# Patient Record
Sex: MALE | Race: OTHER | Employment: UNEMPLOYED | URBAN - METROPOLITAN AREA
[De-identification: names, ages, dates, MRNs, and addresses within clinical notes are randomized per-mention and may not be internally consistent; named-entity substitution may affect disease eponyms.]

---

## 2021-01-16 RX ORDER — DEXTROMETHORPHAN/PSEUDOEPHED 2.5-7.5/.8
1.2 DROPS ORAL
Status: CANCELLED | OUTPATIENT
Start: 2021-01-16

## 2021-01-16 RX ORDER — DIPHENHYDRAMINE HYDROCHLORIDE 50 MG/ML
50 INJECTION, SOLUTION INTRAMUSCULAR; INTRAVENOUS ONCE
Status: CANCELLED | OUTPATIENT
Start: 2021-01-16 | End: 2021-01-16

## 2021-01-16 RX ORDER — EPINEPHRINE 0.1 MG/ML
1 INJECTION INTRACARDIAC; INTRAVENOUS
Status: CANCELLED | OUTPATIENT
Start: 2021-01-16 | End: 2021-01-17

## 2021-01-16 RX ORDER — SODIUM CHLORIDE 0.9 % (FLUSH) 0.9 %
5-40 SYRINGE (ML) INJECTION AS NEEDED
Status: CANCELLED | OUTPATIENT
Start: 2021-01-16

## 2021-01-16 RX ORDER — ATROPINE SULFATE 0.1 MG/ML
0.5 INJECTION INTRAVENOUS
Status: CANCELLED | OUTPATIENT
Start: 2021-01-16 | End: 2021-01-17

## 2021-01-16 RX ORDER — SODIUM CHLORIDE 0.9 % (FLUSH) 0.9 %
5-40 SYRINGE (ML) INJECTION EVERY 8 HOURS
Status: CANCELLED | OUTPATIENT
Start: 2021-01-16

## 2021-01-18 ENCOUNTER — HOSPITAL ENCOUNTER (OUTPATIENT)
Dept: PREADMISSION TESTING | Age: 81
Discharge: HOME | End: 2021-01-18
Payer: MEDICARE

## 2021-01-18 PROCEDURE — U0003 INFECTIOUS AGENT DETECTION BY NUCLEIC ACID (DNA OR RNA); SEVERE ACUTE RESPIRATORY SYNDROME CORONAVIRUS 2 (SARS-COV-2) (CORONAVIRUS DISEASE [COVID-19]), AMPLIFIED PROBE TECHNIQUE, MAKING USE OF HIGH THROUGHPUT TECHNOLOGIES AS DESCRIBED BY CMS-2020-01-R: HCPCS

## 2021-01-19 LAB — SARS-COV-2, COV2NT: NOT DETECTED

## 2021-01-20 ENCOUNTER — HOSPITAL ENCOUNTER (OUTPATIENT)
Age: 81
Setting detail: OUTPATIENT SURGERY
Discharge: HOME | End: 2021-01-20
Attending: INTERNAL MEDICINE | Admitting: INTERNAL MEDICINE
Payer: MEDICARE

## 2021-01-20 VITALS
TEMPERATURE: 97.2 F | RESPIRATION RATE: 16 BRPM | DIASTOLIC BLOOD PRESSURE: 49 MMHG | OXYGEN SATURATION: 100 % | BODY MASS INDEX: 22.48 KG/M2 | HEIGHT: 66 IN | WEIGHT: 139.9 LBS | SYSTOLIC BLOOD PRESSURE: 115 MMHG | HEART RATE: 58 BPM

## 2021-01-20 LAB — GLUCOSE BLD STRIP.AUTO-MCNC: 120 MG/DL (ref 70–110)

## 2021-01-20 PROCEDURE — 77030013991 HC SNR POLYP ENDOSC BSC -A: Performed by: INTERNAL MEDICINE

## 2021-01-20 PROCEDURE — 77030039961 HC KT CUST COLON BSC -D: Performed by: INTERNAL MEDICINE

## 2021-01-20 PROCEDURE — 99152 MOD SED SAME PHYS/QHP 5/>YRS: CPT | Performed by: INTERNAL MEDICINE

## 2021-01-20 PROCEDURE — 76040000007: Performed by: INTERNAL MEDICINE

## 2021-01-20 PROCEDURE — 2709999900 HC NON-CHARGEABLE SUPPLY: Performed by: INTERNAL MEDICINE

## 2021-01-20 PROCEDURE — 99153 MOD SED SAME PHYS/QHP EA: CPT | Performed by: INTERNAL MEDICINE

## 2021-01-20 PROCEDURE — 74011250636 HC RX REV CODE- 250/636: Performed by: INTERNAL MEDICINE

## 2021-01-20 PROCEDURE — 82962 GLUCOSE BLOOD TEST: CPT

## 2021-01-20 PROCEDURE — 77030040361 HC SLV COMPR DVT MDII -B: Performed by: INTERNAL MEDICINE

## 2021-01-20 PROCEDURE — 88305 TISSUE EXAM BY PATHOLOGIST: CPT

## 2021-01-20 RX ORDER — METOPROLOL SUCCINATE 25 MG/1
TABLET, EXTENDED RELEASE ORAL DAILY
COMMUNITY

## 2021-01-20 RX ORDER — FLUMAZENIL 0.1 MG/ML
0.2 INJECTION INTRAVENOUS
Status: DISCONTINUED | OUTPATIENT
Start: 2021-01-20 | End: 2021-01-20 | Stop reason: HOSPADM

## 2021-01-20 RX ORDER — FUROSEMIDE 20 MG/1
TABLET ORAL DAILY
COMMUNITY

## 2021-01-20 RX ORDER — SODIUM CHLORIDE 9 MG/ML
1000 INJECTION, SOLUTION INTRAVENOUS CONTINUOUS
Status: DISCONTINUED | OUTPATIENT
Start: 2021-01-20 | End: 2021-01-20 | Stop reason: HOSPADM

## 2021-01-20 RX ORDER — GUAIFENESIN 100 MG/5ML
81 LIQUID (ML) ORAL DAILY
COMMUNITY

## 2021-01-20 RX ORDER — METFORMIN HYDROCHLORIDE 500 MG/1
TABLET ORAL 2 TIMES DAILY WITH MEALS
COMMUNITY

## 2021-01-20 RX ORDER — ATORVASTATIN CALCIUM 20 MG/1
TABLET, FILM COATED ORAL DAILY
COMMUNITY

## 2021-01-20 RX ORDER — GLIPIZIDE 5 MG/1
5 TABLET ORAL 2 TIMES DAILY
COMMUNITY

## 2021-01-20 RX ORDER — LISINOPRIL 20 MG/1
TABLET ORAL DAILY
COMMUNITY

## 2021-01-20 RX ORDER — FENTANYL CITRATE 50 UG/ML
100 INJECTION, SOLUTION INTRAMUSCULAR; INTRAVENOUS
Status: DISCONTINUED | OUTPATIENT
Start: 2021-01-20 | End: 2021-01-20 | Stop reason: HOSPADM

## 2021-01-20 RX ORDER — NALOXONE HYDROCHLORIDE 0.4 MG/ML
0.4 INJECTION, SOLUTION INTRAMUSCULAR; INTRAVENOUS; SUBCUTANEOUS
Status: DISCONTINUED | OUTPATIENT
Start: 2021-01-20 | End: 2021-01-20 | Stop reason: HOSPADM

## 2021-01-20 RX ORDER — MIDAZOLAM HYDROCHLORIDE 1 MG/ML
.25-5 INJECTION, SOLUTION INTRAMUSCULAR; INTRAVENOUS
Status: DISCONTINUED | OUTPATIENT
Start: 2021-01-20 | End: 2021-01-20 | Stop reason: HOSPADM

## 2021-01-20 RX ADMIN — SODIUM CHLORIDE 1000 ML: 900 INJECTION, SOLUTION INTRAVENOUS at 11:21

## 2021-01-20 NOTE — H&P
Assessment/Plan  # Detail Type Description    1. Assessment Anemia (D64.9). Patient Plan 78 yr old male patient of Dr. Dixon Feldman seen for anemia. Hgb 11.3 on Dec 24, 2019 with normal MCV and MCH. I have no other values or other tests. need to investigate         2. Assessment Constipation (K59.00). Patient Plan 78 yr old male patient of Dr. Dixon Feldman seen for constipation. daughter is translating for him as he doesn't speak any english. He has been constipated x 6 months now he has one bm every 2 to 1 day. He has to take Milk of Magnesia every 2 days to help with his bowels. he is living recently with his daughter. no rectal bleeding or melena. Rarely he has mild rare vague abdominal pain? when constipated. he never had a colonoscopy. He denied having dyspepsia, heartburns, N/vx, dysphagia or weight loss. No smoking, drinking or taking NSAID's  We need to do a colonoscopy and investigate the anemia. R/o hypothyroid  no family history of colon neoplasm. I explained to the patient the procedure of colonoscopy and the risks involved including but not limited to bleeding, perforation, infection or missing a lesion if the bowels are not well clean or are unusually tortuous and difficult. I gave him the  Suprep. He was agreeable to this and answered his questions. don't take medication the morning of the procedure but bring all his medications with him    Plan Orders CBC, Platelet; No Differential to be performed. , Comp. Metabolic Panel (14) to be performed., Iron and TIBC to be performed. and TSH to be performed. This 78year old male presents for Anemia and Constipation. History of Present Illness:  1. Anemia   Relevant medical history includes diabetes. Associated symptoms include constipation.  Pertinent negatives include abdominal pain, amenorrhea, anorexia, black tarry stools, bleeding gums, bone pain, brittle nails, chest pain, chills, cold intolerance, dark urine, decreased libido, depression, diarrhea, dizziness, dyspnea, fatigue, gait disturbance, headache, hypotension, impaired sense of smell, impotence, irritability, jaundice, joint pain, nausea, numbness, pagophagia, pallor, pica, pigment change, sore mouth, sore tongue, syncope, tachycardia, tingling, vomiting, weakness and weight loss. Additional information: Patient was referred for anemia from primary care physician. 2.  Constipation   Severity level 5. The patient describes it as difficulty passing, hard and no mucus. It occurs randomly. The problem is with no change. Denies aggravating symptoms. Relieving factors include laxatives. Pertinent negatives include abdominal pain, anorexia, back pain, black tarry stools, bleeding with bowel movement, bloating, change in appetite, change in stool caliber, change in stool pattern, flatulence, nausea, pain with passing stool, sedentary activity, vomiting, weight gain and weight loss. Additional information: No family history of Colon Cancer, No family history of Crohn's/Colitis, No recent travel out of the country and Patient has to take milk magnesium to use the bathroom. PROBLEM LIST:   Problem List reviewed. PAST MEDICAL/SURGICAL HISTORY   (Detailed)    Disease/disorder Onset Date Management Date Comments     Heart Surgery     Diabetes       Hypertension             Family History  (Detailed)  Relationship Family Member Name  Age at Death Condition Onset Age Cause of Death       Family history of Diabetes mellitus  N         Social History:  (Detailed)  Tobacco use reviewed. Preferred language is Niuean. MARITAL STATUS/FAMILY/SOCIAL SUPPORT  Marital status: Single   Tobacco use status: Current non-smoker. Smoking status: Never smoker. TOBACCO SCREENING:  Patient has never used tobacco. Patient has not used tobacco in the last 30 days. Patient has not used smokeless tobacco in the last 30 days.     SMOKING STATUS  Type Smoking Status Usage Per Day Years Used Pack Years Total Pack Years    Never smoker         ALCOHOL  There is no history of alcohol use. CAFFEINE  The patient uses caffeine: coffee. LIFESTYLE  PETS   cats. Medications (active prior to today)  Medication Name Sig Description Start Date Stop Date Refilled Rx Elsewhere   glipizide 5 mg tablet take 1 tablet by oral route 2 times every day before meals //   Y   aspirin 81 mg chewable tablet chew 1 tablet by oral route  every day //   Y   atorvastatin 40 mg tablet take 1 tablet by oral route  every day //   Y   furosemide 20 mg tablet take 1 tablet by oral route  every day //   Y   lisinopril 20 mg tablet take 1 tablet by oral route  every day //   Y   metformin 500 mg tablet take 1 tablet by oral route 2 times every day with morning and evening meals //   Y   metoprolol succinate ER 50 mg tablet,extended release 24 hr take 0.5 tablet by ORAL route  every day //   Y     Patient Status   Completed with information received for patient in a summary of care record. Medication Reconciliation  Medications reconciled today.   Medication Reviewed  Adherence Medication Name Sig Desc Elsewhere Status   taking as directed glipizide 5 mg tablet take 1 tablet by oral route 2 times every day before meals Y Verified   taking as directed aspirin 81 mg chewable tablet chew 1 tablet by oral route  every day Y Verified   taking as directed atorvastatin 40 mg tablet take 1 tablet by oral route  every day Y Verified   taking as directed furosemide 20 mg tablet take 1 tablet by oral route  every day Y Verified   taking as directed lisinopril 20 mg tablet take 1 tablet by oral route  every day Y Verified   taking as directed metformin 500 mg tablet take 1 tablet by oral route 2 times every day with morning and evening meals Y Verified   taking as directed metoprolol succinate ER 50 mg tablet,extended release 24 hr take 0.5 tablet by ORAL route  every day Y Verified     Medications (Added, Continued or Stopped today)  Start Date Medication Directions PRN Status PRN Reason Instruction Stop Date    aspirin 81 mg chewable tablet chew 1 tablet by oral route  every day N       atorvastatin 40 mg tablet take 1 tablet by oral route  every day N       furosemide 20 mg tablet take 1 tablet by oral route  every day N      04/09/2020 GaviLyte-N 420 gram oral solution take as prescribed by physician N       glipizide 5 mg tablet take 1 tablet by oral route 2 times every day before meals N       lisinopril 20 mg tablet take 1 tablet by oral route  every day N       metformin 500 mg tablet take 1 tablet by oral route 2 times every day with morning and evening meals N       metoprolol succinate ER 50 mg tablet,extended release 24 hr take 0.5 tablet by ORAL route  every day N        Allergies:  Ingredient Reaction (Severity) Medication Name Comment   NO KNOWN ALLERGIES          ORDERS:  Status Lab Order Time Frame Comments   ordered CBC, Platelet; No Differential     ordered Comp. Metabolic Panel (14)     ordered Iron and TIBC     ordered TSH       COMMUNICATION:  Call Type Contact Date Details Employee Location After Hours   Other 04/09/2020 10:07 AM  [facility]     Review of System  System Neg/Pos Details   Constitutional Negative Chills, Fatigue, Fever, Irritability, Malaise, Pallor, Sedentary activity, Weight gain and Weight loss. ENMT Negative Bleeding gums, Ear infections, Impaired sense of smell, Nasal congestion, Sinus Infection, Sore mouth, Sore throat and Sore tongue. Eyes Negative Double vision and Eye pain. Respiratory Negative Asthma, Chronic cough, Dyspnea, Pleuritic pain and Wheezing. Cardio Negative Chest pain, Edema, Hypotension, Irregular heartbeat/palpitations and Tachycardia. GI Positive Constipation.    GI Negative Abdominal pain, Anorexia, Black tarry stools, Bleeding with bowel movement, Bloating, Change in appetite, Change in bowel habits, Change in stool caliber, Change in stool pattern, Decreased appetite, Diarrhea, Dysphagia, Flatulence, Heartburn, Hematemesis, Hematochezia, Jaundice, Melena, Nausea, Painful defecation, Reflux and Vomiting.  Negative Back pain, Dark urine, Dysuria, Hematuria, Impotence, Urinary frequency, Urinary incontinence and Urinary retention. Endocrine Negative Cold intolerance, Gynecomastia, Heat intolerance and Increased thirst.   Neuro Negative Dizziness, Gait disturbance, Headache, Numbness, Syncope, Tingling, Tremors and Vertigo. Psych Negative Anxiety, Depression, Increased stress, Pagophagia and Pica. Integumentary Negative Brittle nails, Hives, Pigment change, Pruritus and Rash. MS Negative Back pain, Bone pain, Joint pain, Myalgia and Weakness. Hema/Lymph Negative Easy bleeding, Easy bruising and Lymphadenopathy. Allergic/Immuno Negative Chemicals in work place, Contact allergy, Food allergies, Immunosuppression and Seasonal allergies. Reproductive Negative Amenorrhea, Decreased libido, Penile discharge and Sexual dysfunction. Vital Signs   Height  Time ft in cm Last Measured Height Position   9:08 AM 5.0 5.50 166.37 01/10/2019 Standing     MAP (Calculated) Arterial Line 1 BP (mmHg) BP Patient Position Resp SpO2 O2 Device O2 Flow Rate (L/min) Pre/Post Ductal Weight       01/20/21 1057 97.2 °F (36.2 °C) 64 117/61 80 -- -- 16 100 % Room air -- -- 63.5 kg (139 lb 14.4 oz)       PHYSICAL EXAM:  Exam Findings Details   Constitutional Normal No acute distress. Well Nourished. Well developed.    Eyes Normal General - Right: Normal, Left: Normal. Conjunctiva - Right: Normal, Left: Normal. Sclera - Right: Normal, Left: Normal. Cornea - Right: Normal, Left: Normal. Pupil - Right: Normal, Left: Normal.   Nose/Mouth/Throat Normal Lips/teeth/gums - Normal. Tongue - Normal. Buccal mucosa - Normal. Palate & uvula - Normal.   Neck Exam Normal Inspection - Normal. Palpation - Normal. Thyroid gland - Normal. Cervical lymph nodes - Normal. Respiratory Normal Inspection - Normal. Auscultation - Normal. Percussion - Normal. Cough - Absent. Effort - Normal.   Cardiovascular Normal Heart rate - Regular rate. Heart sounds - Normal S1, Normal S2. Murmurs - None. Extremities - No edema. Abdomen * Inspection - scaphoid abdomen. Abdomen Normal Patient is not obese. Appliance(s) - None. Abdominal muscles - Normal. Auscultation - Normal. Percussion - Normal. Anterior palpation - Normal, No guarding, No rebound. CVA tenderness - None. Umbilicus - Normal. Abdominal reflexes - Normal. No abdominal tenderness. No hepatic enlargement. No spleen enlargement. No hernia. No ascites. No palpable mass. Alvarado's sign - Normal.   Skin Normal Inspection - Normal.   Musculoskeletal Normal Hands - Left: Normal, Right: Normal.   Extremity Normal No cyanosis. No edema. Clubbing - Absent. Neurological Normal Level of consciousness - Normal. Orientation - Normal. Memory - Normal. Motor - Normal. Balance & gait - Normal. Coordination - Normal. Fine motor skills - Normal. DTRs - Normal.   Psychiatric Comments slow 1+   Psychiatric Normal Orientation - Oriented to time, place, person & situation. Not anxious. Appropriate mood and affect. Behavior is appropriate for age. Sufficient language. No memory loss.          No change in H&P

## 2021-01-20 NOTE — DISCHARGE INSTRUCTIONS
Amrita Budjeronimo  [id number]  1940    COLON DISCHARGE INSTRUCTIONS    Discomfort:  Redness at IV site- apply warm compress to area; if redness or soreness persist- contact your physician  There may be a slight amount of blood passed from the rectum  Gaseous discomfort- walking, belching will help relieve any discomfort  You may not operate a vehicle til the next day. You may not engage in an occupation involving machinery or appliances til the next day. You may not drink alcoholic beverages til the next day. DIET:   High fiber diet. ACTIVITY:  You may not  resume your normal daily activities til the next day. it is recommended that you spend the remainder of the day resting -  avoid any strenuous activity. CALL M.D.  IF ANY SIGN OF:   Increasing pain, nausea, vomiting  Abdominal distension (swelling)  New increased bleeding (oral or rectal)  Fever (chills)  Pain in chest area  Bloody discharge from nose or mouth  Shortness of breath    You may  take any Advil, Aspirin, Ibuprofen, Motrin, Aleve, or Goodys for 7 days, ONLY  Tylenol as needed for pain. Can resume the baby ASA tomorrow  Post procedure diagnosis:  SIGMOID DIVERTICULOSIS; POLYP    Follow-up Instructions:   No need for follow up colonoscopy after this one. We will notify you the results of your biopsy by letter within 2 weeks. Ava Wallace MD  January 20, 2021       DISCHARGE SUMMARY from Nurse    PATIENT INSTRUCTIONS:    After general anesthesia or intravenous sedation, for 24 hours or while taking prescription Narcotics:  · Limit your activities  · Do not drive and operate hazardous machinery  · Do not make important personal or business decisions  · Do  not drink alcoholic beverages  · If you have not urinated within 8 hours after discharge, please contact your surgeon on call.     Report the following to your surgeon:  · Excessive pain, swelling, redness or odor of or around the surgical area  · Temperature over 100.5  · Nausea and vomiting lasting longer than 4 hours or if unable to take medications  · Any signs of decreased circulation or nerve impairment to extremity: change in color, persistent  numbness, tingling, coldness or increase pain  · Any questions    What to do at Home:  Recommended activity: Activity as tolerated and no driving for today. *  Please give a list of your current medications to your Primary Care Provider. *  Please update this list whenever your medications are discontinued, doses are      changed, or new medications (including over-the-counter products) are added. *  Please carry medication information at all times in case of emergency situations. These are general instructions for a healthy lifestyle:    No smoking/ No tobacco products/ Avoid exposure to second hand smoke  Surgeon General's Warning:  Quitting smoking now greatly reduces serious risk to your health. Obesity, smoking, and sedentary lifestyle greatly increases your risk for illness    A healthy diet, regular physical exercise & weight monitoring are important for maintaining a healthy lifestyle    You may be retaining fluid if you have a history of heart failure or if you experience any of the following symptoms:  Weight gain of 3 pounds or more overnight or 5 pounds in a week, increased swelling in our hands or feet or shortness of breath while lying flat in bed. Please call your doctor as soon as you notice any of these symptoms; do not wait until your next office visit. The discharge information has been reviewed with the patient and children. The patient and children verbalized understanding. Discharge medications reviewed with the patient and children and appropriate educational materials and side effects teaching were provided.   _________________________________________  Patient armband removed and shredded  ________________________________________  Patient Education        Colonoscopia: Goldy Francisco en el hogar  Colonoscopy: What to Expect at Home  Tristan recuperación  Después de zan tenido camilla colonoscopia, permanecerá en la clínica michelle 1 o 2 horas hasta que el efecto del medicamento haya desaparecido. Joshua Colon a tristan hogar. Simba tendrá que hacer arreglos para que alguien lo lleve. Tristan médico le indicará cuándo podrá comer y hacer ana lilia otras actividades habituales. Tristan médico hablará con usted acerca de cuándo deberá hacerse la siguiente colonoscopia. El médico puede ayudarle a decidir con qué frecuencia necesita que le revisen. [place] dependerá de [employer] de la prueba y de tristan riesgo de cáncer colorrectal.  Después de la prueba, es posible que esté abotagado (inflado) o tenga deisy causados por gases. Es posible que necesite expulsar gases. Si le hicieron camilla biopsia o le extirparon un pólipo, es posible que tenga restos de [place] en las heces michelle algunos días. Problemas billy sangrado rectal intenso podrían no ocurrir hasta varias semanas después de la prueba. [place] no es común. Simba puede suceder después de la extirpación de los pólipos. Esta hoja de [employer] idea general del tiempo que le llevará recuperarse. Sin embargo, cada persona se recupera a un ritmo diferente. Siga los pasos que se mencionan a continuación para recuperarse lo más rápido posible. ¿Cómo puede cuidarse en el hogar? Actividad    · Descanse cuando esté cansado.     · Puede hacer ana lilia actividades habituales cuando usted crea que es adecuado. Alimentación    · Siga las indicaciones de tristan médico para comer.     · Ct abundantes líquidos, a menos que el médico le haya indicado lo contrario. [place] ayuda a reponer los líquidos que perdió michelle la preparación del colon.     · No ct alcohol. Medicamentos    · Tristan médico le dirá si puede volver a segun ana lilia medicamentos y cuándo puede volver a hacerlo.  También le dará indicaciones sobre cualquier medicamento nuevo que deba segun usted.     · Si polly aspirina o cualquier otro medicamento que previene los coágulos de [place], pregúntele a tristan médico si debería volver a tomarlo y en qué momento. Asegúrese de entender exactamente lo que tristan médico quiere que korin.     · Si le extirparon pólipos o le hicieron camilla biopsia michelle la prueba, es posible que tristan médico le diga que no tome aspirina ni otros antiinflamatorios michelle algunos días. Estos incluyen ibuprofeno (Advil, Motrin) y naproxeno (Aleve). Otras instrucciones    · Por tristan seguridad, no debe conducir ni operar maquinaria hasta que hayan desaparecido los efectos del medicamento y pueda pensar con claridad. Tristan médico podría indicarle que no conduzca ni opere maquinaria hasta el día posterior a la prueba.     · No firme documentos legales ni tome decisiones importantes hasta que los efectos del medicamento hayan desaparecido y pueda pensar con claridad. La anestesia puede hacer que le sea difícil comprender plenamente lo que usted está acordando. La atención de seguimiento es camilla parte clave de tristan tratamiento y seguridad. Asegúrese de hacer y acudir a todas las citas, y llame a tristan médico si está teniendo problemas. También es camilla buena idea saber los resultados de los exámenes y mantener camilla lista de los medicamentos que polly. ¿Cuándo debe pedir ayuda? Llame al 911 en cualquier momento que considere que necesita atención de [place]. Por ejemplo, llame si:    · Se desmayó (perdió el conocimiento).     · Evacua heces rojizas o sanguinolentas (con ashleigh).     · Tiene dolor abdominal intenso. Llame a tristan médico ahora mismo o busque atención médica inmediata si:    · Maria Del Rosario heces son negruzcas y parecidas al alquitrán.     · Maria Del Rosario heces tienen rastros de [place], aunque no le hicieron camilla biopsia ni le extirparon pólipos.     · Tiene dolor abdominal, o tristan abdomen está hinchado y giselle.     · Vomita.     · Tiene fiebre.     · Está muy mareado.    Preste especial atención a los cambios en tristan maliha y asegúrese de comunicarse con tristan médico si tiene algún problema. ¿Dónde puede encontrar más información en inglés? Vaya a [email or web address].com/  [place] E[address] en la búsqueda para aprender más acerca de \"Colonoscopia: Qué esperar en el hogar. \"  Revisado: 29 abril, 2020               Versión del contenido: 12.6  © 9138-6477 [employer], [employer]. Las instrucciones de cuidado fueron adaptadas bajo licencia por [employer] (which disclaims liability or warranty for this information). Si usted tiene [place] afección médica o sobre estas instrucciones, siempre pregunte a tristan profesional de maliha.  [employer], [employer] niega toda garantía o responsabilidad por tristan uso de esta información.       __________________________________________________

## 2021-01-20 NOTE — PROCEDURES
[facility]  Colonoscopy Procedure Report  _______________________________________________________  Patient: Beatrice Valdez                                        Attending Physician: Kathi Dsouza MD    Patient ID: [id number]                                    Referring Physician: Gonzalez Hagan MD    Exam Date: 1/20/2021      Introduction: A  78 y.o. male patient, presents for inpatient Colonoscopy    Indications: patient of Dr. Archie Lee seen for constipation. daughter is translating for him as he doesn't speak any english. He has been constipated x 6 months now he has one bm every 2 to 1 day. He has to take Milk of Magnesia every 2 days to help with his bowels. he is living recently with his daughter. no rectal bleeding or melena. Rarely he has mild rare vague abdominal pain? when constipated. he never had a colonoscopy. He denied having dyspepsia, heartburns, N/vx, dysphagia or weight loss. No smoking, drinking or taking NSAID's. Consent: The benefits, risks, and alternatives to the procedure were discussed and informed consent was obtained from the patient. Preparation: EKG, pulse, pulse oximetry and blood pressure were monitored throughout the procedure. ASA Classification: Class II- . The heart is an S1-S2 and regular heart rate and rhythm. Lungs are clear to auscultation and percussion. Abdomen is soft, nondistended, and nontender. Mental Status: awake, alert, and oriented to person, place, and time    Medications:  · Fentanyl 75 mcg IV before procedure. · Versed 3 mg IV throughout the procedure. Rectal Exam: Normal Rectal Exam. No Blood. Prostate not enlarged    Pathology Specimens:  1    Procedure: The colonoscope was passed with ease through the anus under direct visualization and advanced to the cecum. The patient required positioning on the back to aid in the passage of the scope. The scope was withdrawn and the mucosa was carefully examined.  The quality of the preparation was excellent. The views were excellent. The patient's toleration of the procedure was excellent. The exam was done twice to the cecum. Retroflexion is made in the ascending colon. Total time is 21 minutes and withdrawal time is 16 minutes. Findings:    Rectum:   Normal   Sigmoid:   Tortuous sigmoid colon with mild sigmoid diverticulosis. Descending Colon:   Normal   Transverse Colon:   4 mm sessile polyp in the proximal transverse colon, cold snared  Ascending Colon:   One small diverticulum in the proximal ascending colon  Cecum:   Normal   Terminal Ileum:   Not entered      Unplanned Events: There were no unplanned events. Estimated Blood Loss: None  Impressions: Small internal hemorrhoids. Tortuous sigmoid colon with mild sigmoid diverticulosis. 4 mm sessile polyp in the proximal transverse colon, cold snared. One small diverticulum in the proximal ascending colon. Normal Mucosa. No blood or AVM found. Complications: None; patient tolerated the procedure well. Recommendations:  · Discharge home when standard parameters are met. · Resume a high fiber diet. · Resume own medications. Avoid all NSAID's for > 7 days but can resume the  Baby ASA tomorrow  · Repeat Colonoscopy not needed after this.   · Take Miralax and/ or Colace 100 mg on regular basis to treat constipation    Procedure Codes:    · Theresa  [[id number]]    Endoscope Information:  Model Number(s)    [id number]   Assistant: None  Signed By: Devaughn Felipe MD Date: 1/20/2021

## 2022-08-05 ENCOUNTER — APPOINTMENT (OUTPATIENT)
Dept: CT IMAGING | Age: 82
End: 2022-08-05
Attending: EMERGENCY MEDICINE
Payer: MEDICARE

## 2022-08-05 ENCOUNTER — HOSPITAL ENCOUNTER (EMERGENCY)
Age: 82
Discharge: HOME | End: 2022-08-05
Attending: EMERGENCY MEDICINE
Payer: MEDICARE

## 2022-08-05 VITALS
RESPIRATION RATE: 18 BRPM | SYSTOLIC BLOOD PRESSURE: 162 MMHG | HEART RATE: 87 BPM | OXYGEN SATURATION: 98 % | DIASTOLIC BLOOD PRESSURE: 64 MMHG | TEMPERATURE: 98.2 F | BODY MASS INDEX: 22.44 KG/M2 | WEIGHT: 139 LBS

## 2022-08-05 DIAGNOSIS — R52 PAIN OF LEFT SIDE OF BODY: Primary | ICD-10-CM

## 2022-08-05 LAB
ABO + RH BLD: NORMAL
ALBUMIN SERPL-MCNC: 3.8 G/DL (ref 3.4–5)
ALBUMIN/GLOB SERPL: 1.2 {RATIO} (ref 0.8–1.7)
ALP SERPL-CCNC: 101 U/L (ref 45–117)
ALT SERPL-CCNC: 36 U/L (ref 16–61)
ANION GAP SERPL CALC-SCNC: 7 MMOL/L (ref 3–18)
AST SERPL-CCNC: 28 U/L (ref 10–38)
BASOPHILS # BLD: 0 K/UL (ref 0–0.1)
BASOPHILS NFR BLD: 1 % (ref 0–2)
BILIRUB SERPL-MCNC: 0.5 MG/DL (ref 0.2–1)
BLOOD GROUP ANTIBODIES SERPL: NORMAL
BUN SERPL-MCNC: 25 MG/DL (ref 7–18)
BUN/CREAT SERPL: 20 (ref 12–20)
CALCIUM SERPL-MCNC: 9.3 MG/DL (ref 8.5–10.1)
CHLORIDE SERPL-SCNC: 104 MMOL/L (ref 100–111)
CO2 SERPL-SCNC: 26 MMOL/L (ref 21–32)
CREAT SERPL-MCNC: 1.22 MG/DL (ref 0.6–1.3)
DIFFERENTIAL METHOD BLD: ABNORMAL
EOSINOPHIL # BLD: 0.1 K/UL (ref 0–0.4)
EOSINOPHIL NFR BLD: 1 % (ref 0–5)
ERYTHROCYTE [DISTWIDTH] IN BLOOD BY AUTOMATED COUNT: 13.2 % (ref 11.6–14.5)
GLOBULIN SER CALC-MCNC: 3.3 G/DL (ref 2–4)
GLUCOSE SERPL-MCNC: 156 MG/DL (ref 74–99)
HCT VFR BLD AUTO: 32.6 % (ref 36–48)
HGB BLD-MCNC: 10.8 G/DL (ref 13–16)
IMM GRANULOCYTES # BLD AUTO: 0 K/UL (ref 0–0.04)
IMM GRANULOCYTES NFR BLD AUTO: 0 % (ref 0–0.5)
INR PPP: 1 (ref 0.8–1.2)
LACTATE SERPL-SCNC: 1.6 MMOL/L (ref 0.4–2)
LYMPHOCYTES # BLD: 0.8 K/UL (ref 0.9–3.6)
LYMPHOCYTES NFR BLD: 10 % (ref 21–52)
MCH RBC QN AUTO: 31.5 PG (ref 24–34)
MCHC RBC AUTO-ENTMCNC: 33.1 G/DL (ref 31–37)
MCV RBC AUTO: 95 FL (ref 78–100)
MONOCYTES # BLD: 1 K/UL (ref 0.05–1.2)
MONOCYTES NFR BLD: 14 % (ref 3–10)
NEUTS SEG # BLD: 5.7 K/UL (ref 1.8–8)
NEUTS SEG NFR BLD: 74 % (ref 40–73)
NRBC # BLD: 0 K/UL (ref 0–0.01)
NRBC BLD-RTO: 0 PER 100 WBC
PLATELET # BLD AUTO: 158 K/UL (ref 135–420)
PMV BLD AUTO: 10.4 FL (ref 9.2–11.8)
POTASSIUM SERPL-SCNC: 4.4 MMOL/L (ref 3.5–5.5)
PROT SERPL-MCNC: 7.1 G/DL (ref 6.4–8.2)
PROTHROMBIN TIME: 13.1 SEC (ref 11.5–15.2)
RBC # BLD AUTO: 3.43 M/UL (ref 4.35–5.65)
SODIUM SERPL-SCNC: 137 MMOL/L (ref 136–145)
SPECIMEN EXP DATE BLD: NORMAL
WBC # BLD AUTO: 7.7 K/UL (ref 4.6–13.2)

## 2022-08-05 PROCEDURE — 71260 CT THORAX DX C+: CPT

## 2022-08-05 PROCEDURE — 99285 EMERGENCY DEPT VISIT HI MDM: CPT

## 2022-08-05 PROCEDURE — 72125 CT NECK SPINE W/O DYE: CPT

## 2022-08-05 PROCEDURE — 70450 CT HEAD/BRAIN W/O DYE: CPT

## 2022-08-05 PROCEDURE — 80053 COMPREHEN METABOLIC PANEL: CPT

## 2022-08-05 PROCEDURE — 93005 ELECTROCARDIOGRAM TRACING: CPT

## 2022-08-05 PROCEDURE — 85610 PROTHROMBIN TIME: CPT

## 2022-08-05 PROCEDURE — 74011000636 HC RX REV CODE- 636: Performed by: EMERGENCY MEDICINE

## 2022-08-05 PROCEDURE — 86900 BLOOD TYPING SEROLOGIC ABO: CPT

## 2022-08-05 PROCEDURE — 83605 ASSAY OF LACTIC ACID: CPT

## 2022-08-05 PROCEDURE — 85025 COMPLETE CBC W/AUTO DIFF WBC: CPT

## 2022-08-05 RX ORDER — MORPHINE SULFATE 4 MG/ML
4 INJECTION INTRAVENOUS
Status: DISCONTINUED | OUTPATIENT
Start: 2022-08-05 | End: 2022-08-06 | Stop reason: HOSPADM

## 2022-08-05 RX ADMIN — IOPAMIDOL 100 ML: 612 INJECTION, SOLUTION INTRAVENOUS at 21:53

## 2022-08-05 NOTE — ED TRIAGE NOTES
Patient reports left arm pain.  Daughter reports patient had a \" tantrum\" and through himself on the floor Monday

## 2022-08-06 LAB
ATRIAL RATE: 75 BPM
CALCULATED P AXIS, ECG09: 56 DEGREES
CALCULATED R AXIS, ECG10: 23 DEGREES
CALCULATED T AXIS, ECG11: 43 DEGREES
DIAGNOSIS, 93000: NORMAL
P-R INTERVAL, ECG05: 180 MS
Q-T INTERVAL, ECG07: 368 MS
QRS DURATION, ECG06: 84 MS
QTC CALCULATION (BEZET), ECG08: 410 MS
VENTRICULAR RATE, ECG03: 75 BPM

## 2022-08-06 NOTE — ED NOTES
Pt reports he has r sided neck pain. pt's daughter reports the pt had a \"temper tantrum\" threw himself on the floor the other day and she thinks he may have hurt it then, Pt has bruising noted to his R chest from the fall. No open wounds. Pt denies any current pain on arrival states his L side of his neck was hurting him. Daughter reports pt has been dx with \"short term memory loss\" denies any dementia.  Pt alert and oriented on arrival.

## 2022-08-06 NOTE — ED PROVIDER NOTES
64-year-old male past medical history of diabetes, CAD and hypertension presents to the emergency department with a left neck left shoulder and left chest pain. Daughter is in the room she states she is the power of  and the patient has short-term memory loss. He is a poor historian. Daughter said that on Tuesday patient threw himself on the floor. Today he started complaining of pain in various areas. She states he is not someone who complains of pain. Unknown what makes it better or worse. No treatment with medications at home. She did note discoloration to his right chest.  The pain is below the left axilla. Patient has no other complaints. Past Medical History:   Diagnosis Date    CAD (coronary artery disease)     Diabetes ([facility] [address]     Hypertension        Past Surgical History:   Procedure Laterality Date    COLONOSCOPY N/A 1/20/2021    COLONOSCOPY; POLYPECTOMY performed by Vince Reyes MD at [address] [facility]           No family history on file. Social History     Socioeconomic History    Marital status:      Spouse name: Not on file    Number of children: Not on file    Years of education: Not on file    Highest education level: Not on file   Occupational History    Not on file   Tobacco Use    Smoking status: Never    Smokeless tobacco: Never   Vaping Use    Vaping Use: Never used   Substance and Sexual Activity    Alcohol use: Not Currently    Drug use: Not Currently    Sexual activity: Not on file   Other Topics Concern    Not on file   Social History Narrative    Not on file     Social Determinants of Health     Financial Resource Strain: Not on file   Food Insecurity: Not on file   Transportation Needs: Not on file   Physical Activity: Not on file   Stress: Not on file   Social Connections: Not on file   Intimate Partner Violence: Not on file   Housing Stability: Not on file         ALLERGIES: Patient has no known allergies.     Review of Systems   Unable to perform ROS: Dementia     Vitals:    08/05/22 1802 08/05/22 1803   BP:  (!) 162/64   Pulse: 87    Resp: 18    Temp: 98.2 °F (36.8 °C)    SpO2: 98%    Weight: 63 kg (139 lb)             Physical Exam  Vitals and nursing note reviewed. Constitutional:       General: He is not in acute distress. Appearance: Normal appearance. He is normal weight. He is not ill-appearing, toxic-appearing or diaphoretic. HENT:      Head: Normocephalic. Nose: Nose normal.      Mouth/Throat:      Mouth: Mucous membranes are moist.      Pharynx: No oropharyngeal exudate or posterior oropharyngeal erythema. Eyes:      Extraocular Movements: Extraocular movements intact. Pupils: Pupils are equal, round, and reactive to light. Neck:      Vascular: No carotid bruit. Cardiovascular:      Rate and Rhythm: Normal rate and regular rhythm. Pulses: Normal pulses. Pulmonary:      Effort: Pulmonary effort is normal. No respiratory distress. Breath sounds: Normal breath sounds. No stridor. No wheezing, rhonchi or rales. Chest:      Chest wall: No tenderness. Abdominal:      General: There is no distension. Palpations: There is no mass. Tenderness: There is no abdominal tenderness. There is no right CVA tenderness, left CVA tenderness, guarding or rebound. Hernia: No hernia is present. Musculoskeletal:         General: No swelling, tenderness, deformity or signs of injury. Normal range of motion. Cervical back: Normal range of motion. No rigidity or tenderness. Right lower leg: No edema. Left lower leg: No edema. Lymphadenopathy:      Cervical: No cervical adenopathy. Skin:     General: Skin is warm. Capillary Refill: Capillary refill takes less than 2 seconds. Findings: Bruising present. Comments: Right chest bruising   Neurological:      General: No focal deficit present. Mental Status: He is alert.    Psychiatric:         Behavior: Behavior normal.        MDM  Number of Diagnoses or Management Options  Diagnosis management comments: 78-year-old male has bruising on the right side of his chest however work-up is unremarkable. Will discharge patient home no signs of trauma on CAT scan blood work is unremarkable. Will discharge home with his daughter.            Procedures

## (undated) DEVICE — TRAP SPEC COLL POLYP POLYSTYR --

## (undated) DEVICE — TRNQT TEXT 1X18IN BLU LF DISP -- CONVERT TO ITEM 362165

## (undated) DEVICE — CATH SUC CTRL PRT TRIFLO 14FR --

## (undated) DEVICE — CANNULA CUSH AD W/ 14FT TBG

## (undated) DEVICE — SET ADMIN 16ML TBNG L100IN 2 Y INJ SITE IV PIGGY BK DISP

## (undated) DEVICE — SYRINGE 50ML E/T

## (undated) DEVICE — SPONGE GZ W4XL4IN RAYON POLY 4 PLY NONWOVEN FASTER WICKING

## (undated) DEVICE — SNARE POLYP SM W13MMXL240CM SHTH DIA2.4MM OVL FLX DISP

## (undated) DEVICE — NDL PRT INJ NSAF BLNT 18GX1.5 --

## (undated) DEVICE — TUBING, SUCTION, 1/4" X 12', STRAIGHT: Brand: MEDLINE

## (undated) DEVICE — NDL FLTR TIP 5 MIC 18GX1.5IN --

## (undated) DEVICE — MAJ-1414 SINGLE USE ADPATER BIOPSY VALV: Brand: SINGLE USE ADAPTOR BIOPSY VALVE

## (undated) DEVICE — GARMENT,MEDLINE,DVT,INT,CALF,MED, GEN2: Brand: MEDLINE

## (undated) DEVICE — KENDALL RADIOLUCENT FOAM MONITORING ELECTRODE RECTANGULAR SHAPE: Brand: KENDALL

## (undated) DEVICE — SYR 3ML LL TIP 1/10ML GRAD --

## (undated) DEVICE — SYR 5ML 1/5 GRAD LL NSAF LF --

## (undated) DEVICE — SINGLE PORT MANIFOLD: Brand: NEPTUNE 2

## (undated) DEVICE — SOLUTION IV 500ML 0.9% SOD CHL FLX CONT

## (undated) DEVICE — SPONGE GZ W4XL4IN COT 12 PLY TYP VII WVN C FLD DSGN

## (undated) DEVICE — CATH IV SAFE STR 22GX1IN BLU -- PROTECTIV PLUS

## (undated) DEVICE — Device

## (undated) DEVICE — WRISTBAND ID AD W2.5XL9.5CM RED VYN ADH CLSR UNI-PRINT